# Patient Record
Sex: FEMALE | Race: WHITE
[De-identification: names, ages, dates, MRNs, and addresses within clinical notes are randomized per-mention and may not be internally consistent; named-entity substitution may affect disease eponyms.]

---

## 2022-05-03 ENCOUNTER — HOSPITAL ENCOUNTER (EMERGENCY)
Dept: HOSPITAL 47 - EC | Age: 27
LOS: 1 days | Discharge: HOME | End: 2022-05-04
Payer: OTHER GOVERNMENT

## 2022-05-03 DIAGNOSIS — F39: Primary | ICD-10-CM

## 2022-05-03 DIAGNOSIS — F12.90: ICD-10-CM

## 2022-05-03 DIAGNOSIS — F17.200: ICD-10-CM

## 2022-05-03 DIAGNOSIS — F90.9: ICD-10-CM

## 2022-05-03 PROCEDURE — 82075 ASSAY OF BREATH ETHANOL: CPT

## 2022-05-03 PROCEDURE — 99284 EMERGENCY DEPT VISIT MOD MDM: CPT

## 2022-05-03 NOTE — ED
General Adult HPI





- General


Source: patient, RN notes reviewed, old records reviewed


Mode of arrival: ambulatory


Limitations: no limitations





<Niles Betancourt - Last Filed: 05/03/22 20:54>





<Rafael Mccormick - Last Filed: 05/04/22 01:36>





- General


Chief complaint: Psychiatric Symptoms


Stated complaint: Mental health


Time Seen by Provider: 05/03/22 19:00





- History of Present Illness


Initial comments: 





This is a 27-year-old female who has a past medical history significant for 

multiple suicide attempts.  Patient states that she has been diagnosed with 

borderline personality disorder.  Patient states she has been having quite a few

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

suicidal if she doesn't get some help.  Patient states she's had taken pills 

multiple times and once tried to hang herself in the past.  Patient does not 

want to get back to making an attempt.  Patient denies any one single thing that

pushed her over the edge recently she states is just as dresses all added 

together.  Patient denies any drug use or alcohol use patient denies any 

physical complaints today except for some mild lower back pain. 

(Niles Betancourt)





- Related Data


                                    Allergies











Allergy/AdvReac Type Severity Reaction Status Date / Time


 


No Known Allergies Allergy   Verified 05/03/22 21:15














Review of Systems


ROS Other: All systems not noted in ROS Statement are negative.





<Niles Betancourt - Last Filed: 05/03/22 20:54>


ROS Other: All systems not noted in ROS Statement are negative.





<Rafael Mccormick - Last Filed: 05/04/22 01:36>


ROS Statement: 


Those systems with pertinent positive or pertinent negative responses have been 

documented in the HPI.








Past Medical History


Past Medical History: No Reported History


History of Any Multi-Drug Resistant Organisms: None Reported


Past Surgical History: No Surgical Hx Reported


Past Psychological History: ADD/ADHD, Panic Disorder


Smoking Status: Current every day smoker


Past Alcohol Use History: Occasional


Past Drug Use History: Marijuana





<Niles Betancourt - Last Filed: 05/03/22 20:54>





General Exam


Limitations: no limitations





<Niles Betancourt - Last Filed: 05/03/22 20:54>





- General Exam Comments


Initial Comments: 





GENERAL:


Patient is well-developed and well-nourished.  Patient is nontoxic and well-

hydrated and is in mild distress.





ENT:


Neck is soft and supple.  No significant lymphadenopathy is noted.  Oropharynx 

is clear.  Moist mucous membranes.  Neck has full range of motion without 

eliciting any pain. 





EYES:


The sclera were anicteric and conjunctiva were pink and moist.  Extraocular 

movements were intact and pupils were equal round and reactive to light.  

Eyelids were unremarkable.





PULMONARY:


Unlabored respirations.  Good breath sounds bilaterally.  No audible rales 

rhonchi or wheezing was noted.





CARDIOVASCULAR:


There is a regular rate and rhythm without any murmurs gallops or rubs.





ABDOMEN:


Soft and nontender with normal bowel sounds.  





SKIN:


Patient has quite a few areas of excoriation on her face she states she picks at

her face when she is extremely stressed





NEUROLOGIC:


Patient is alert and oriented x3.  Cranial nerves II through XII are grossly 

intact.  Motor and sensory are also intact.  Normal speech, volume and content. 

Symmetrical smile. 





MUSCULOSKELETAL:


Normal extremities with adequate strength and full range of motion.  





LYMPHATICS:


No significant lymphadenopathy is noted





PSYCHIATRIC:


Patient states there on the verge of becoming suicidal though she is not 

currently.  Patient states she cannot handle the stresses in her life currently 

(Niles Betancourt)





Course





                                   Vital Signs











  05/03/22 05/03/22





  17:36 19:25


 


Temperature 98.3 F 98.2 F


 


Pulse Rate 89 89


 


Respiratory 20 17





Rate  


 


Blood Pressure 114/79 115/72


 


O2 Sat by Pulse 98 95





Oximetry  














Medical Decision Making





<Niles Betancourt - Last Filed: 05/03/22 20:54>





- Medical Decision Making





Dr. Cotton taking over care of this patient at 9 PM (Niles Betancourt)





Disposition





<Niles Betancourt - Last Filed: 05/03/22 20:54>


Is patient prescribed a controlled substance at d/c from ED?: No





<Rafael Mccormick - Last Filed: 05/04/22 01:36>


Clinical Impression: 


 Mood disorder





Disposition: HOME SELF-CARE


Condition: Good


Instructions (If sedation given, give patient instructions):  Mood Disorders 

(ED)


Referrals: 


Desmond Houston [Primary Care Provider] - 1-2 days

## 2022-05-04 VITALS
SYSTOLIC BLOOD PRESSURE: 112 MMHG | HEART RATE: 72 BPM | TEMPERATURE: 97.7 F | RESPIRATION RATE: 14 BRPM | DIASTOLIC BLOOD PRESSURE: 73 MMHG

## 2022-08-15 ENCOUNTER — HOSPITAL ENCOUNTER (EMERGENCY)
Dept: HOSPITAL 47 - EC | Age: 27
Discharge: LEFT BEFORE DISCHARGE | End: 2022-08-15
Payer: COMMERCIAL

## 2022-08-15 VITALS
TEMPERATURE: 98 F | DIASTOLIC BLOOD PRESSURE: 87 MMHG | RESPIRATION RATE: 20 BRPM | SYSTOLIC BLOOD PRESSURE: 116 MMHG | HEART RATE: 84 BPM

## 2022-08-15 DIAGNOSIS — Z53.21: Primary | ICD-10-CM

## 2022-08-15 PROCEDURE — 87635 SARS-COV-2 COVID-19 AMP PRB: CPT

## 2022-08-15 PROCEDURE — 99499 UNLISTED E&M SERVICE: CPT

## 2022-11-09 ENCOUNTER — HOSPITAL ENCOUNTER (INPATIENT)
Dept: HOSPITAL 47 - EC | Age: 27
LOS: 6 days | Discharge: HOME | DRG: 885 | End: 2022-11-15
Attending: PSYCHIATRY & NEUROLOGY | Admitting: PSYCHIATRY & NEUROLOGY
Payer: COMMERCIAL

## 2022-11-09 DIAGNOSIS — R45.851: ICD-10-CM

## 2022-11-09 DIAGNOSIS — Z20.822: ICD-10-CM

## 2022-11-09 DIAGNOSIS — Z71.6: ICD-10-CM

## 2022-11-09 DIAGNOSIS — Z79.899: ICD-10-CM

## 2022-11-09 DIAGNOSIS — F60.3: ICD-10-CM

## 2022-11-09 DIAGNOSIS — G47.00: ICD-10-CM

## 2022-11-09 DIAGNOSIS — F41.0: ICD-10-CM

## 2022-11-09 DIAGNOSIS — F10.10: ICD-10-CM

## 2022-11-09 DIAGNOSIS — F17.210: ICD-10-CM

## 2022-11-09 DIAGNOSIS — Z86.59: ICD-10-CM

## 2022-11-09 DIAGNOSIS — F31.30: Primary | ICD-10-CM

## 2022-11-09 DIAGNOSIS — F12.20: ICD-10-CM

## 2022-11-09 PROCEDURE — 84443 ASSAY THYROID STIM HORMONE: CPT

## 2022-11-09 PROCEDURE — 83036 HEMOGLOBIN GLYCOSYLATED A1C: CPT

## 2022-11-09 PROCEDURE — 82075 ASSAY OF BREATH ETHANOL: CPT

## 2022-11-09 PROCEDURE — 82248 BILIRUBIN DIRECT: CPT

## 2022-11-09 PROCEDURE — 87635 SARS-COV-2 COVID-19 AMP PRB: CPT

## 2022-11-09 PROCEDURE — 80061 LIPID PANEL: CPT

## 2022-11-09 PROCEDURE — 80306 DRUG TEST PRSMV INSTRMNT: CPT

## 2022-11-09 PROCEDURE — 80053 COMPREHEN METABOLIC PANEL: CPT

## 2022-11-09 PROCEDURE — 81001 URINALYSIS AUTO W/SCOPE: CPT

## 2022-11-09 PROCEDURE — 81025 URINE PREGNANCY TEST: CPT

## 2022-11-09 PROCEDURE — 80178 ASSAY OF LITHIUM: CPT

## 2022-11-09 PROCEDURE — 99285 EMERGENCY DEPT VISIT HI MDM: CPT

## 2022-11-09 PROCEDURE — 85025 COMPLETE CBC W/AUTO DIFF WBC: CPT

## 2022-11-09 NOTE — ED
General Adult HPI





<JaceRafael - Last Filed: 11/10/22 02:18>





- General


Source: patient, RN notes reviewed, old records reviewed


Mode of arrival: ambulatory


Limitations: no limitations





<Murali Kilpatrick - Last Filed: 11/10/22 23:33>





- General


Chief complaint: Psychiatric Symptoms


Stated complaint: mental health


Time Seen by Provider: 11/09/22 18:05





- History of Present Illness


Initial comments: 





Patient is a 27 year old female who presents emergency department complaining of

suicidal ideations, plan.  She does have a history depression.  Is on bupropion.

 States this is been a recurrent issue but over the last 1-2 weeks it is gotten 

worse.  Denies any worsening stressors.  Does endorse suicidal ideations, as 

well as a plan of possibly overdosing.  Denies any attempts.  Has been self 

harming.  Has multiple superficial lacerations over the arms and legs.  She is 

up-to-date on tetanus.  Denies homicidal ideations, attempts, plans.  Denies 

visual or auditory hallucinations.  Denies chest pain, abdominal pain, nausea, 

vomiting.  Denies any shortness breath.  His no other acute complaints at this 

time.  Patient presents seeking psychiatric evaluation at this time. 

(Murali Kilpatrick)





- Related Data


                                Home Medications











 Medication  Instructions  Recorded  Confirmed


 


Dextroamphetamine/Amphetamine 10 mg PO DAILY@1030 05/03/22 08/15/22





[Adderall]   


 


Dextroamphetamine/Amphetamine 15 mg PO DAILY@0500 05/03/22 08/15/22





[Adderall]   


 


busPIRone HCl [Buspar] 10 mg PO BID 08/15/22 08/15/22








                                  Previous Rx's











 Medication  Instructions  Recorded


 


Albuterol Sulfate [Albuterol 1 puff PO Q4-6H #8.5 gm 08/15/22





Sulfate Hfa]  


 


Azithromycin [Zithromax] 250 mg PO AS DIRECTED 5 Days #6 tab 08/15/22


 


CHLORPHEN-HYDROcod 8-10mg/5ml 5 ml PO Q12HR PRN 3 Days #30 ml 08/15/22





[Tussionex]  











                                    Allergies











Allergy/AdvReac Type Severity Reaction Status Date / Time


 


No Known Allergies Allergy   Verified 11/10/22 06:06














Review of Systems


ROS Other: All systems not noted in ROS Statement are negative.





<Rafael Mccormick - Last Filed: 11/10/22 02:18>


ROS Other: All systems not noted in ROS Statement are negative.





<Murali Kilpatrick - Last Filed: 11/10/22 23:33>


ROS Statement: 


Those systems with pertinent positive or pertinent negative responses have been 

documented in the HPI.





Review of Systems:


CONST: Denies fever 


EYES: Denies blurry vision 


ENT: Denies nasal congestion  


C/V:  Denies Chest pain


RESP: Denies shortness of breath 


GI: Denies abdominal pain 


: Denies dysuria  


SKIN: Denies rash.


MSK: Denies joint pain.


NEURO: Denies headache 


PSYCH: Denies homicidal ideations/plans/attempts.  Denies visual or auditory 

hallucinations.  She endorses suicidal ideation, plan.  Denies attempt.  

Endorses self-harm. (Murali Kilpatrick)





Past Medical History


Past Medical History: No Reported History


History of Any Multi-Drug Resistant Organisms: None Reported


Past Surgical History: No Surgical Hx Reported


Past Psychological History: ADD/ADHD, Panic Disorder


Smoking Status: Current every day smoker


Past Alcohol Use History: Occasional


Past Drug Use History: Marijuana





<Murali Kilpatrick - Last Filed: 11/10/22 23:33>





General Exam


Limitations: no limitations





<Murali Kilpatrick - Last Filed: 11/10/22 23:33>





- General Exam Comments


Initial Comments: 





General: Appears in no acute distress.


HEAD:  Normal with no signs of head trauma.


EYES:  PERRLA, EOMI, conjunctiva normal, no discharge.


ENT:  Hearing grossly intact, normal oropharynx.


RESPIRATORY:  Clear breath sounds bilaterally.  No wheezes, rales, or rhonchi.  


C/V:  Regular rate and rhythm. S1 and S2 auscultated, no edema, peripheral 

pulses 2+ and intact throughout


ABD:  Abd is soft, nontender, nondistended


EXT: Normal range of motion, no obvious deformity


SKIN:  No rashes or lesions observed on exposed skin.


NEURO: Alert and oriented 4. (Murali Kilpatrick)





Course


                                   Vital Signs











  11/09/22 11/10/22 11/10/22





  17:39 00:05 02:40


 


Temperature 98.8 F  98.6 F


 


Pulse Rate 108 H 96 76


 


Respiratory 18 16 18





Rate   


 


Blood Pressure 139/83  100/63


 


O2 Sat by Pulse 97 100 100





Oximetry   














Medical Decision Making





<Murali Kilpatrick - Last Filed: 11/10/22 23:33>





- Medical Decision Making





Based on the patient's presentation and physical exam, I do believe the patient 

requires psychiatric evaluation.  Sitter was ordered.  Suicide precautions were 

ordered.  She was placed in green scrubs.  BAT is 0.  UDS is pending.  Vital 

signs within acceptable limits.  She is up-to-date on her tetanus.





Patient is medically cleared for evaluation by psychiatry.  EPS is notified.  

Disposition is pending psychiatric evaluation.








Patient was evaluated by EPS eventually admitted to inpatient psychiatry. 

(Murali Kilpatrick)





- Lab Data


                                   Lab Results











  11/10/22 11/10/22 11/10/22 Range/Units





  00:05 00:05 00:05 


 


Urine Color   Yellow   


 


Urine Appearance   Cloudy H   (Clear)  


 


Urine pH   6.0   (5.0-8.0)  


 


Ur Specific Gravity   1.030   (1.001-1.035)  


 


Urine Protein   1+ H   (Negative)  


 


Urine Glucose (UA)   Negative   (Negative)  


 


Urine Ketones   1+ H   (Negative)  


 


Urine Blood   Negative   (Negative)  


 


Urine Nitrite   Positive H   (Negative)  


 


Urine Bilirubin   Negative   (Negative)  


 


Urine Urobilinogen   <2.0   (<2.0)  mg/dL


 


Ur Leukocyte Esterase   Negative   (Negative)  


 


Urine RBC   3   (0-5)  /hpf


 


Urine WBC   3   (0-5)  /hpf


 


Ur Squamous Epith Cells   24 H   (0-4)  /hpf


 


Urine Bacteria   Moderate H   (None)  /hpf


 


Urine Mucus   Many H   (None)  /hpf


 


Urine HCG, Qual    Not Detected  (Not Detectd)  


 


Urine Opiates Screen  Not Detected    (NotDetected)  


 


Ur Oxycodone Screen  Not Detected    (NotDetected)  


 


Urine Methadone Screen  Not Detected    (NotDetected)  


 


Ur Propoxyphene Screen  Not Detected    (NotDetected)  


 


Ur Barbiturates Screen  Not Detected    (NotDetected)  


 


U Tricyclic Antidepress  Not Detected    (NotDetected)  


 


Ur Phencyclidine Scrn  Not Detected    (NotDetected)  


 


Ur Amphetamines Screen  Not Detected    (NotDetected)  


 


U Methamphetamines Scrn  Not Detected    (NotDetected)  


 


U Benzodiazepines Scrn  Not Detected    (NotDetected)  


 


Urine Cocaine Screen  Not Detected    (NotDetected)  


 


U Marijuana (THC) Screen  Detected H    (NotDetected)  


 


Coronavirus (PCR)     (Not Detectd)  














  11/10/22 Range/Units





  02:40 


 


Urine Color   


 


Urine Appearance   (Clear)  


 


Urine pH   (5.0-8.0)  


 


Ur Specific Gravity   (1.001-1.035)  


 


Urine Protein   (Negative)  


 


Urine Glucose (UA)   (Negative)  


 


Urine Ketones   (Negative)  


 


Urine Blood   (Negative)  


 


Urine Nitrite   (Negative)  


 


Urine Bilirubin   (Negative)  


 


Urine Urobilinogen   (<2.0)  mg/dL


 


Ur Leukocyte Esterase   (Negative)  


 


Urine RBC   (0-5)  /hpf


 


Urine WBC   (0-5)  /hpf


 


Ur Squamous Epith Cells   (0-4)  /hpf


 


Urine Bacteria   (None)  /hpf


 


Urine Mucus   (None)  /hpf


 


Urine HCG, Qual   (Not Detectd)  


 


Urine Opiates Screen   (NotDetected)  


 


Ur Oxycodone Screen   (NotDetected)  


 


Urine Methadone Screen   (NotDetected)  


 


Ur Propoxyphene Screen   (NotDetected)  


 


Ur Barbiturates Screen   (NotDetected)  


 


U Tricyclic Antidepress   (NotDetected)  


 


Ur Phencyclidine Scrn   (NotDetected)  


 


Ur Amphetamines Screen   (NotDetected)  


 


U Methamphetamines Scrn   (NotDetected)  


 


U Benzodiazepines Scrn   (NotDetected)  


 


Urine Cocaine Screen   (NotDetected)  


 


U Marijuana (THC) Screen   (NotDetected)  


 


Coronavirus (PCR)  Not Detected  (Not Detectd)  














Disposition


Is patient prescribed a controlled substance at d/c from ED?: No





<Rafael Mccormick - Last Filed: 11/10/22 02:18>





<Murali Kilpatrick - Last Filed: 11/10/22 23:33>


Clinical Impression: 


 Mood disorder





Disposition: ADMITTED AS IP TO THIS [facility]


Condition: Fair

## 2022-11-10 LAB
KETONES UR QL STRIP.AUTO: (no result)
PH UR: 6 [PH] (ref 5–8)
PROT UR QL: (no result)
RBC UR QL: 3 /HPF (ref 0–5)
SP GR UR: 1.03 (ref 1–1.03)
SQUAMOUS UR QL AUTO: 24 /HPF (ref 0–4)
UROBILINOGEN UR QL STRIP: <2 MG/DL (ref ?–2)
WBC #/AREA URNS HPF: 3 /HPF (ref 0–5)

## 2022-11-10 RX ADMIN — NICOTINE SCH: 7 PATCH, EXTENDED RELEASE TRANSDERMAL at 12:04

## 2022-11-10 RX ADMIN — LITHIUM CARBONATE SCH MG: 300 CAPSULE, GELATIN COATED ORAL at 21:00

## 2022-11-10 RX ADMIN — LITHIUM CARBONATE SCH MG: 300 CAPSULE, GELATIN COATED ORAL at 12:03

## 2022-11-10 NOTE — P.HP
Psychiatric H&P





- .


H&P Date: 11/10/22


History & Physical: 


                                    Allergies











Allergy/AdvReac Type Severity Reaction Status Date / Time


 


No Known Allergies Allergy   Verified 11/10/22 06:06








                                   Vital Signs











Temp  98.3 F   11/10/22 05:27


 


Pulse  74   11/10/22 05:27


 


Resp  18   11/10/22 05:27


 


BP  126/67   11/10/22 05:27


 


Pulse Ox  96   11/10/22 05:27


 


FiO2      








                                 Intake & Output











 11/09/22 11/10/22 11/10/22





 18:59 06:59 18:59


 


Weight 59.421 kg 58.06 kg 








                             Laboratory Last Values











Urine Color  Yellow   11/10/22  00:05    


 


Urine Appearance  Cloudy  (Clear)  H  11/10/22  00:05    


 


Urine pH  6.0  (5.0-8.0)   11/10/22  00:05    


 


Ur Specific Gravity  1.030  (1.001-1.035)   11/10/22  00:05    


 


Urine Protein  1+  (Negative)  H  11/10/22  00:05    


 


Urine Glucose (UA)  Negative  (Negative)   11/10/22  00:05    


 


Urine Ketones  1+  (Negative)  H  11/10/22  00:05    


 


Urine Blood  Negative  (Negative)   11/10/22  00:05    


 


Urine Nitrite  Positive  (Negative)  H  11/10/22  00:05    


 


Urine Bilirubin  Negative  (Negative)   11/10/22  00:05    


 


Urine Urobilinogen  <2.0 mg/dL (<2.0)   11/10/22  00:05    


 


Ur Leukocyte Esterase  Negative  (Negative)   11/10/22  00:05    


 


Urine RBC  3 /hpf (0-5)   11/10/22  00:05    


 


Urine WBC  3 /hpf (0-5)   11/10/22  00:05    


 


Ur Squamous Epith Cells  24 /hpf (0-4)  H  11/10/22  00:05    


 


Urine Bacteria  Moderate /hpf (None)  H  11/10/22  00:05    


 


Urine Mucus  Many /hpf (None)  H  11/10/22  00:05    


 


Urine HCG, Qual  Not Detected  (Not Detectd)   11/10/22  00:05    


 


Urine Opiates Screen  Not Detected  (NotDetected)   11/10/22  00:05    


 


Ur Oxycodone Screen  Not Detected  (NotDetected)   11/10/22  00:05    


 


Urine Methadone Screen  Not Detected  (NotDetected)   11/10/22  00:05    


 


Ur Propoxyphene Screen  Not Detected  (NotDetected)   11/10/22  00:05    


 


Ur Barbiturates Screen  Not Detected  (NotDetected)   11/10/22  00:05    


 


U Tricyclic Antidepress  Not Detected  (NotDetected)   11/10/22  00:05    


 


Ur Phencyclidine Scrn  Not Detected  (NotDetected)   11/10/22  00:05    


 


Ur Amphetamines Screen  Not Detected  (NotDetected)   11/10/22  00:05    


 


U Methamphetamines Scrn  Not Detected  (NotDetected)   11/10/22  00:05    


 


U Benzodiazepines Scrn  Not Detected  (NotDetected)   11/10/22  00:05    


 


Urine Cocaine Screen  Not Detected  (NotDetected)   11/10/22  00:05    


 


U Marijuana (THC) Screen  Detected  (NotDetected)  H  11/10/22  00:05    


 


Coronavirus (PCR)  Not Detected  (Not Detectd)   11/10/22  02:40    











11/10/22 12:31


IDENTIFYING DATA: Patient is a 27-year-old  female who is currently 

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





HPI: Patient presented to the hospital yesterday complaining of suicidal 

ideations with plans of possibly overdosing.  Patient also claimed in the ER 

that she is going to press for the past 1 or 2 weeks and also endorsing 

significant stressors and having lacerations over her legs and also her arms and

history of self harming.  Patient's UDS positive for THC.  Patient has been 

depressed for the past couple of weeks.  She states that she "stopped existing" 

and described anhedonia, a motivation.  She also describes poor energy.  Claims 

that she is also feeling overwhelmed.  States that she has a history of having 

mood swings and explained having "highs" and described manic like features where

she has a lot of energy with decreased need for sleep and wanting to go out all 

the time.  She states that she frequently then crashes into a depressed mood.  

She states that she has had some stressors since moving to [place] last 

December.  Claims that she has been into a few bad relationships.  Claims that 

she has been difficulty finding hours with her job and also poor finances.  

Claims that she has significant anxiety during the day.  She says she is still 

having suicidal thoughts, no intent or plan.  Describes not having any homicidal

ideations.  Claims that her sleep and appetite have been "on and off". At this 

time patient denies any auditory or visual hallucinations.  Patient denies any 

current flight of ideas racing thoughts and increased in goal directed behavior.

 Patient admits to using cannabis heavy doses frequently/daily, cigarettes 

daily, alcohol, binge episodes at times on weekends.





PAST PSYCHIATRIC HISTORY: Patient states that she has a history of bipolar 

disorder and borderline personality disorder.  She also states that she does 

have a history of ADHD.  And claims that she has been on different medications 

in the past including BuSpar, Wellbutrin, Adderall.  She states that she has 

been hospitalized psychiatrically several times in the past approximately 14 or 

15 times in different states.  Patient denies any psychiatric outpatient follow-

up.  He claims that she has had several suicide attempts in the past however did

not describe what she has done.





PMH:denies





ALLERGIES: as per EMR





CHEMICAL DEPENDENCY HISTORY: as per HPI





FAMILY PSYCHIATRIC/SUBSTANCE USE HISTORY: Claims that there is significant 

bipolar disorder in her family





SOCIAL HISTORY: Patient was born and raised in [facility].  She states 

that she was "raised all over in a  family".  States that her parents 

currently live in [place].  She states that she moved to [place] about a year 

ago.  Claims that she completed high school.  Denies any legal history.  Does 

not have any kids.  Lives alone in a house.  She works as a cleaning lady..





MENTAL STATUS EXAM: 


General Appearance: Patient appears to be then, has very short hair, glasses, 

acne, stated age is alert, directable, and attempts to cooperate. Patient 

appears to have poor hygiene and grooming.  She has several abrasions and 

lacerations over her legs and her arms.


Behavior: Patient is seated without any agitated behavior. 


Speech: Patient's speech is fluent and nonpressured.


Mood/Affect: Patient reports their mood is depressed and anxious, affect is 

congruent and constricted. 


Suicidality/Homicidality:  Patient denies having any homicidal ideation intent 

or plan. [Described having suicidal thoughts, no intent or plan.


Perceptions: Patient denies any visual hallucinations and denies any auditory 

hallucinations


Though content/process: There is no evidence of any delusional thought content 

and thought process is linear and goal-directed.  Rambles at times.


Memory and concentration: AOX3, grossly intact for the purposes of this session.

 Can spell "WORLD" backwards


Judgment and insight: poor





STRENGTHS/WEAKNESSES: strength is that patient is resilient. Weakness is that 

patient has poor judgment and is impulsive





INTELLECT: average





IMPRESSIONS: 


Bipolar disorder, current episode depressed


Borderline personality disorder


Cannabis use disorder, severe


Alcohol use disorder mild


Nicotine dependence





PLAN: 


-Patient is admitted under voluntary status to [facility] for stabilization of 

psychiatric symptoms and safety. Patient has signed adult voluntary form and 

medication consent and is placed in patient's chart. 


-Medications : Will start patient on lithium 300 mg twice a day for mood 

stabilization/suicidal thoughts.  Trazodone 50 mg daily at bedtime for 

mood/insomnia.  Vistaril by mouth when necessary for anxiety.


-Ativan and Haldol PRN for agitation/aggression


-Patient was counselled on substance abuse and desired to cut back on use


-Patient was informed of the risks, benefits and side effects of the medication 

and patient verbally consented to taking the medications. Patient signed med 

consent form and was placed in chart.


-Internal Medicine consult to perform medical evaluation and physical.


-NRT - nicotine patch


-SW on board for discharge planning. Encourage patient to participate in groups 

to work on coping skills.

## 2022-11-11 LAB
ALBUMIN SERPL-MCNC: 4.7 G/DL (ref 3.5–5)
ALP SERPL-CCNC: 71 U/L (ref 38–126)
ALT SERPL-CCNC: 21 U/L (ref 4–34)
ANION GAP SERPL CALC-SCNC: 9 MMOL/L
AST SERPL-CCNC: 18 U/L (ref 14–36)
BASOPHILS # BLD AUTO: 0 K/UL (ref 0–0.2)
BASOPHILS NFR BLD AUTO: 0 %
BILIRUB INDIRECT SERPL-MCNC: 0.4 MG/DL (ref 0–1.1)
BILIRUBIN DIRECT+TOT PNL SERPL-MCNC: 0.3 MG/DL (ref 0–0.2)
BUN SERPL-SCNC: 9 MG/DL (ref 7–17)
CALCIUM SPEC-MCNC: 9.6 MG/DL (ref 8.4–10.2)
CHLORIDE SERPL-SCNC: 104 MMOL/L (ref 98–107)
CHOLEST SERPL-MCNC: 130 MG/DL (ref 0–200)
CO2 SERPL-SCNC: 26 MMOL/L (ref 22–30)
EOSINOPHIL # BLD AUTO: 0.2 K/UL (ref 0–0.7)
EOSINOPHIL NFR BLD AUTO: 3 %
ERYTHROCYTE [DISTWIDTH] IN BLOOD BY AUTOMATED COUNT: 4.56 M/UL (ref 3.8–5.4)
ERYTHROCYTE [DISTWIDTH] IN BLOOD: 12.7 % (ref 11.5–15.5)
GLUCOSE SERPL-MCNC: 78 MG/DL (ref 74–99)
HCT VFR BLD AUTO: 42.7 % (ref 34–46)
HDLC SERPL-MCNC: 51.1 MG/DL (ref 40–60)
HGB BLD-MCNC: 14.4 GM/DL (ref 11.4–16)
LDLC SERPL CALC-MCNC: 62.6 MG/DL (ref 0–131)
LYMPHOCYTES # SPEC AUTO: 2.2 K/UL (ref 1–4.8)
LYMPHOCYTES NFR SPEC AUTO: 28 %
MCH RBC QN AUTO: 31.5 PG (ref 25–35)
MCHC RBC AUTO-ENTMCNC: 33.6 G/DL (ref 31–37)
MCV RBC AUTO: 93.6 FL (ref 80–100)
MONOCYTES # BLD AUTO: 0.3 K/UL (ref 0–1)
MONOCYTES NFR BLD AUTO: 4 %
NEUTROPHILS # BLD AUTO: 4.8 K/UL (ref 1.3–7.7)
NEUTROPHILS NFR BLD AUTO: 63 %
PLATELET # BLD AUTO: 337 K/UL (ref 150–450)
POTASSIUM SERPL-SCNC: 4.6 MMOL/L (ref 3.5–5.1)
PROT SERPL-MCNC: 7.4 G/DL (ref 6.3–8.2)
SODIUM SERPL-SCNC: 139 MMOL/L (ref 137–145)
TRIGL SERPL-MCNC: 81.6 MG/DL (ref 0–149)
VLDLC SERPL CALC-MCNC: 16.32 MG/DL (ref 5–40)
WBC # BLD AUTO: 7.7 K/UL (ref 3.8–10.6)

## 2022-11-11 RX ADMIN — LITHIUM CARBONATE SCH MG: 300 CAPSULE, GELATIN COATED ORAL at 20:16

## 2022-11-11 RX ADMIN — NICOTINE SCH: 7 PATCH, EXTENDED RELEASE TRANSDERMAL at 08:41

## 2022-11-11 RX ADMIN — LITHIUM CARBONATE SCH MG: 300 CAPSULE, GELATIN COATED ORAL at 08:41

## 2022-11-11 NOTE — P.PN
Progress Note - Text


Progress Note Date: 11/11/22





Interval History:


Patient was seen sitting in on group today and was directable and agreeable to 

speak with writer in the office.  Patient appears to have a brighter affect 

today.  She states that she feels "a bit better" with regards to her mood.  She 

is denying any mood swings at this time.  States that she is feeling less 

depressed and also mild improvement in her anxiety.  She does state that she 

does feel "mildly nauseous" at times during the day.  We spoke more about her 

medications which she is agreeable to continuing taking.  We'll check a lithium 

level on Monday.  She claims that she slept quite a bit during the day yesterday

and also broken up at nighttime wants to remain on the trazodone. At this time 

patient denies any suicidal or homical ideations, intent or plan. Patient denies

any auditory, visual hallucinations and denies any paranoia or delusions. 

Patient denies any side effects from the medications and has been compliant with

meds. 





Mental Status Exam:


General Appearance: Patient appears to be then, has very short hair, glasses, 

acne, stated age is alert, directable, and attempts to cooperate. Patient 

appears to have improving hygiene and grooming. She has several abrasions and 

lacerations over her legs and her arms.


Behavior: Patient is seated without any agitated behavior. 


Speech: Patient's speech is fluent and nonpressured.


Mood/Affect: Patient reports their mood is depressed and anxious, affect is 

congruent and constricted. 


Suicidality/Homicidality:  Patient denies having any homicidal ideation intent 

or plan. Denies having suicidal thoughts, no intent or plan.


Perceptions: Patient denies any visual hallucinations and denies any auditory 

hallucinations


Though content/process: There is no evidence of any delusional thought content 

and thought process is linear and goal-directed.  Rambles at times.


Memory and concentration: AOX3, grossly intact for the purposes of this session


Judgment and insight: poor, improving moderately





IMPRESSIONS: 


Bipolar disorder, current episode depressed


Borderline personality disorder


Cannabis use disorder, severe


Alcohol use disorder mild


Nicotine dependence





Plan:


-Patient continues to meet criteria for inpatient psychiatric admission for 

symptom stabilization and safety. Patient has signed adult voluntary form and 

medication consent and was placed in patient's chart.


-Medications: Continue lithium 300 mg twice a day for mood 

stabilization/suicidal thoughts.  Trazodone 50 mg daily at bedtime for mo

od/insomnia. Vistaril by mouth when necessary for anxiety.  Added Zofran when 

necessary for nausea.


-When necessary Ativan and Haldol for agitation/aggression.


-NRT - nicotine patch


-SW on board for discharge planning.  Encouraged the patient to participate in 

milieu. will check lithium level monday and likely discharge either monday vs 

tuesday.

## 2022-11-12 RX ADMIN — NICOTINE SCH: 7 PATCH, EXTENDED RELEASE TRANSDERMAL at 08:57

## 2022-11-12 RX ADMIN — LITHIUM CARBONATE SCH MG: 300 CAPSULE, GELATIN COATED ORAL at 08:57

## 2022-11-12 RX ADMIN — LITHIUM CARBONATE SCH MG: 300 CAPSULE, GELATIN COATED ORAL at 20:37

## 2022-11-12 NOTE — P.PN
Progress Note - Text


Progress Note Date: 11/12/22


Interval history: 


Patient was seen eating her snack and was directable and agreeable to speak with

writer. She reports mixed emotions of anxiety and depression, mood had been up 

and down today, and shows me that she self-harmed by cutting herself with a 

piece of plastic, has 3 new superficial scratches on her left forearm and has 

multiple nail marks on her right arm. She reports the unit has been loud due to 

influx of more patients today and this has been distressing for her today. At 

this time, patient endorses suicidal thoughts with no specific plan, and denies 

homicidal ideation, intent or plan. Denies any auditory or visual 

hallucinations. Patient denies any side effects from the medications and has 

been compliant with meds. She has a long history of trauma, and has fair sleep. 

She has been on Seroquel previously and reports she felt worse on this and 

overdosed on it several years ago. 





Mental status exam: 


General Appearance: Patient appears to be stated age, slender, has multiple 

excoriation marks on face, scratches/superficial cuts on left forearm, and nail 

marks on right arm. 


Behavior: No agitated behavior. Reports she self-harmed today as described 

above.


Speech: Patient's speech is fluent and non-pressured. 


Mood/Affect: Mood is "up and down", affect is congruent and labile.


Suicidality/Homicidality:  Patient endorses SI today, but denies homicidal 

ideation intent or plan.  


Perceptions: Patient denies any auditory or visual hallucinations.  


Though content/process: There is no evidence of any delusional thought content 

and thought process is linear and goal-directed. 


Memory and concentration: AOX3, grossly intact for the purposes of this session


Judgment and insight: poor, improving mildly





Assessment/Plan: 


Continue with current diagnosis. Patient continues to meet criteria for 

inpatient psychiatric admission for symptom stabilization and safety.


Patient will be maintained on current psychotropic medication regimen. She has 

tried several meds in the past with side effects. She prefers to keep meds the 

same for today and we will discuss again tomorrow.


Monitor for medication compliance and for any psychotropic medication side 

effects. 


Will continue to monitor ongoing response to treatment.  Encouraged 

participation in milieu.

## 2022-11-13 RX ADMIN — LURASIDONE HYDROCHLORIDE SCH MG: 20 TABLET, FILM COATED ORAL at 18:49

## 2022-11-13 RX ADMIN — NICOTINE SCH: 7 PATCH, EXTENDED RELEASE TRANSDERMAL at 09:36

## 2022-11-13 RX ADMIN — LITHIUM CARBONATE SCH MG: 300 CAPSULE, GELATIN COATED ORAL at 08:41

## 2022-11-13 RX ADMIN — LITHIUM CARBONATE SCH MG: 300 CAPSULE, GELATIN COATED ORAL at 21:16

## 2022-11-13 NOTE — P.PN
Progress Note - Text


Progress Note Date: 11/13/22


Interval history: 


Patient was seen attending group and was directable and agreeable to speak with 

writer. She reports her mood is somewhat better today since the unit is not as 

'chaotic' as it was yesterday. She reports having mood swings, fair sleep with 

nighttime awakenings and difficulty returning to sleep. She reports feelings of 

'panic' and has a long history of chilhood trauma. She also reports a history of

"bulimia and anorexia" in her teens and does not want to take medications that 

would cause weight gain. We discussed her medications and other medication 

options for mood stabilization, and she agrees to start Latuda for mood.  At 

this time, patient denies suicidal or homicidal ideation, intent or plan. Denies

any auditory or visual hallucinations. Patient denies any side effects from the 

medications and has been compliant with meds. 





Mental status exam: 


General Appearance: Patient appears to be stated age, slender, has multiple 

excoriation marks on face, scratches/superficial cuts on left forearm, and nail 

marks on right arm. 


Behavior: No agitated behavior. 


Speech: Patient's speech is fluent and non-pressured. 


Mood/Affect: Mood is improving mildly, affect is congruent and labile.


Suicidality/Homicidality:  Patient denies suicidal and homicidal ideation, 

intent or plan.  


Perceptions: Patient denies any auditory or visual hallucinations.  


Though content/process: There is no evidence of any delusional thought content 

and thought process is linear and goal-directed. 


Memory and concentration: AOX3, grossly intact for the purposes of this session


Judgment and insight: poor, improving mildly





Assessment/Plan: 


Continue with current diagnosis. Patient continues to meet criteria for 

inpatient psychiatric admission for symptom stabilization and safety.


Start Latuda 20 mg daily with supper for mood stabilization, with plan to 

gradually increase as tolerated. 


Monitor for medication compliance and for any psychotropic medication side 

effects. 


Will continue to monitor ongoing response to treatment.  Encouraged 

participation in milieu.

## 2022-11-14 RX ADMIN — LITHIUM CARBONATE SCH MG: 300 CAPSULE, GELATIN COATED ORAL at 21:09

## 2022-11-14 RX ADMIN — LITHIUM CARBONATE SCH MG: 300 CAPSULE, GELATIN COATED ORAL at 08:41

## 2022-11-14 RX ADMIN — LURASIDONE HYDROCHLORIDE SCH MG: 20 TABLET, FILM COATED ORAL at 17:15

## 2022-11-14 RX ADMIN — NICOTINE SCH: 7 PATCH, EXTENDED RELEASE TRANSDERMAL at 08:41

## 2022-11-14 NOTE — P.PN
Progress Note - Text


Progress Note Date: 11/14/22





Interval history: 


Patient was seen sitting in her room today and was directable and agreeable to 

speak with writer.  Patient claims that she is doing a bit better today with 

regards to her mood however states that she does feel anxious during the day.  

Claims that she did take an Ativan over the weekend which helped.  She states 

that she feels uncomfortable with some patients on the unit when they are 

yelling and fighting.  She states that she is feeling more optimistic at this 

time and mood has been improving.  Claims that she has been trying to go to 

groups and participate his mother so she can.  States that her mood is becoming 

more stable at this time.  We spoke about her lithium level. At this time, 

patient denies suicidal or homicidal ideation, intent or plan. Denies any 

auditory or visual hallucinations. Patient denies any side effects from the 

medications and has been compliant with meds. 





Mental status exam: 


General Appearance: Patient appears to be stated age, slender, has multiple 

excoriation marks on face, scratches/superficial cuts on left forearm, and nail 

marks on right arm. 


Behavior: No agitated behavior.  Cooperative.


Speech: Patient's speech is fluent and non-pressured. 


Mood/Affect: Mood is improving mildly, affect is congruent 


Suicidality/Homicidality:  Patient denies suicidal and homicidal ideation, 

intent or plan.  


Perceptions: Patient denies any auditory or visual hallucinations.  


Though content/process: There is no evidence of any delusional thought content 

and thought process is linear and goal-directed. 


Memory and concentration: AOX3, grossly intact for the purposes of this session


Judgment and insight: improving mildly





Assessment/Plan: 


Continue with current diagnosis. Patient continues to meet criteria for 

inpatient psychiatric admission for symptom stabilization and safety.


Continue with Latuda 20 mg daily with supper for mood stabilization.  increase 

trazodone to 100 mg qhs for insomnia/ mood. Continue with lithium 300 milligrams

twice a day for mood stabilization/suicidal thoughts.  Vistaril when necessary 

for anxiety.


Monitor for medication compliance and for any psychotropic medication side 

effects. 


Will continue to monitor ongoing response to treatment.  Encouraged 

participation in milieu.  Likely discharge back home tomorrow.

## 2022-11-15 VITALS
SYSTOLIC BLOOD PRESSURE: 106 MMHG | TEMPERATURE: 97.5 F | DIASTOLIC BLOOD PRESSURE: 57 MMHG | HEART RATE: 86 BPM | RESPIRATION RATE: 17 BRPM

## 2022-11-15 RX ADMIN — LITHIUM CARBONATE SCH MG: 300 CAPSULE, GELATIN COATED ORAL at 09:09

## 2022-11-15 NOTE — P.DS
Providers


Date of admission: 


11/10/22 03:25





Expected date of discharge: 11/15/22


Attending physician: 


Sarabjit Cobb MD





Consults: 





                                        





11/10/22 03:49


Consult Physician Routine 


   Consulting Provider: [facility]


   Consult Reason/Comments: For H & P for Medical Follow Up


   Do you want consulting provider notified?: Yes











Primary care physician: 


St[facility] None








- Discharge Diagnosis(es)


(1) Bipolar disorder current episode depressed


Current Visit: Yes   Status: Acute   Priority: High   





(2) Borderline personality disorder


Current Visit: Yes   Status: Acute   Priority: Medium   





(3) Cannabis use disorder, severe, dependence


Current Visit: Yes   Status: Acute   Priority: Medium   





(4) Alcohol use disorder, mild, abuse


Current Visit: Yes   Status: Acute   Priority: Low   





(5) Nicotine dependence


Current Visit: Yes   Status: Acute   Priority: Low   


Hospital Course: 





Admission HPI:


Admission note was completed by writer "Patient is a 27-year-old  

female who is currently working as a cleaning lady locally, lives alone in a 

house, has no kids. Patient presented to the hospital yesterday complaining of 

suicidal ideations with plans of possibly overdosing.  Patient also claimed in 

the ER that she is going to press for the past 1 or 2 weeks and also endorsing 

significant stressors and having lacerations over her legs and also her arms and

history of self harming.  Patient's UDS positive for THC.  Patient has been 

depressed for the past couple of weeks.  She states that she "stopped existing" 

and described anhedonia, a motivation.  She also describes poor energy.  Claims 

that she is also feeling overwhelmed.  States that she has a history of having 

mood swings and explained having "highs" and described manic like features where

she has a lot of energy with decreased need for sleep and wanting to go out all 

the time.  She states that she frequently then crashes into a depressed mood.  

She states that she has had some stressors since moving to [place] last 

December.  Claims that she has been into a few bad relationships.  Claims that 

she has been difficulty finding hours with her job and also poor finances.  

Claims that she has significant anxiety during the day.  She says she is still 

having suicidal thoughts, no intent or plan.  Describes not having any homicidal

ideations.  Claims that her sleep and appetite have been "on and off". At this 

time patient denies any auditory or visual hallucinations.  Patient denies any 

current flight of ideas racing thoughts and increased in goal directed behavior.

 Patient admits to using cannabis heavy doses frequently/daily, cigarettes 

daily, alcohol, binge episodes at times on weekends."





Hospital course:


Upon admission to the unit patient was directable and agreeable to commence 

treatment and signed adult voluntary form. Patient got along well with other 

patients on the unit and followed unit protocol.  Patient was compliant with the

medications and denied any side effects throughout hospital course.  Patient was

started on latuda 20 mg with dinner for mood stabilization/depression, lithium 

300 mg bid for mood stabilization, trazodone 100 mg daily at bedtime for 

insomnia/mood, Vistaril when necessary for anxiety.  Patient spoke of her 

stressors and engaged in therapy both group and individual.  Patient was also 

seen by medical team for history and physical exam.  Patient had a lithium level

drawn on 11/14 - 0.9. Throughout the course of the hospitalization patient 

gradually improved with regards to mood stabilization, anxiety, suicidal 

thoughts, sleep and became more future oriented with improved insight and 

judgment. On the day of discharge patient denied any suicidal or homicidal 

ideations intent or plan denied any auditory or visual hallucinations. Patient 

endorsed wanting to live for her health and her future.  The patient denied any 

access to guns or weapons.  Patient denied any paranoia and did not endorse any 

delusions.  Patient does have a significant history of substance abuse and was 

counseled on abstaining from all substances including alcohol and marijuana. 

Patient was offered however declined inpatient substance-abuse rehab.  Patient 

was also counseled on the medications and need for regular compliance and was 

encouraged to follow-up with their outpatient appointment for mental health and 

also for primary care.  Prior to discharge a family meeting will be arranged by 

 to answer any questions and ensure safety upon discharge. 





Mental status exam:


General Appearance: Patient appears to be thin, short hair, wearing glasses, 

stated age is alert, pleasant, and cooperative. Patient is in no acute distress 

and has improved hygiene and grooming 


Behavior: Patient is calmly seated without any agitated behavior.


Speech: Patient's speech is fluent and nonpressured. 


Mood/Affect: Patient reports their mood is "better", affect is congruent and 

euthymic. 


Suicidality/Homicidality:  Patient denies having any suicidal or homicidal 

ideation intent or plan.  


Perceptions: Patient denies any auditory or visual hallucinations.  


Though content/process: There is no evidence of any delusional thought content 

and thought process is linear and goal-directed. more future oriented


Memory and concentration: AOX3, grossly intact for the purposes of this session.

Can spell "WORLD" backwards correctly.


Judgment and insight:  improved with guarded prognosis





Impression:


Bipolar disorder, current episode depressed


Borderline personality disorder


Cannabis use disorder severe


Alcohol use disorder mild


Nicotine dependence





Plan:


-Continue with discharge today as patient has improved and stabilized 

psychiatrically and is not currently an imminent threat to herself and/or 

others. Patient will remain at chronically elevated risk for harm to self and/or

others due to her impulsivity and substance abuse.


-Continue medications: [id number] mg with dinner for mood 

stabilization/depression, trazodone 100 mg daily at bedtime for insomnia/mood, 

lithium 300 mg twice a day for mood stabilization/suicidal thoughts, Vistaril 

when necessary for anxiety.


-Patient was counseled on the need for medication compliance and appropriate 

follow-up at mental health and also primary care for medical issues.  Patient 

verbalized understanding and agreed.


-Social work to arrange for and conduct family meeting to ensure safety upon 

discharge and answer any questions/concerns. Social work also to arrange for 

patients follow up appointments  for psychiatric care along with follow up with 

primary care provider.


-Patient counseled on abstaining from recreational drugs and marijuana and 

alcohol. Was informed/educated on the adverse effects on their physical and 

mental health. Patient verbally agreed and understood. Patient was offered 

substance abuse treatment however declined at this time.


-Patient was instructed to return to the hospital or seek immediate medical care

if their psychiatric or medical symptoms do worsen or reoccur.








                                    Allergies











Allergy/AdvReac Type Severity Reaction Status Date / Time


 


No Known Allergies Allergy   Verified 11/10/22 06:06











                               Laboratory Results











WBC  7.7 k/uL (3.8-10.6)   11/11/22  07:44    


 


RBC  4.56 m/uL (3.80-5.40)   11/11/22  07:44    


 


Hgb  14.4 gm/dL (11.4-16.0)   11/11/22  07:44    


 


Hct  42.7 % (34.0-46.0)   11/11/22  07:44    


 


MCV  93.6 fL (80.0-100.0)   11/11/22  07:44    


 


MCH  31.5 pg (25.0-35.0)   11/11/22  07:44    


 


MCHC  33.6 g/dL (31.0-37.0)   11/11/22  07:44    


 


RDW  12.7 % (11.5-15.5)   11/11/22  07:44    


 


Plt Count  337 k/uL (150-450)   11/11/22  07:44    


 


MPV  7.6   11/11/22  07:44    


 


Neutrophils %  63 %  11/11/22  07:44    


 


Lymphocytes %  28 %  11/11/22  07:44    


 


Monocytes %  4 %  11/11/22  07:44    


 


Eosinophils %  3 %  11/11/22  07:44    


 


Basophils %  0 %  11/11/22  07:44    


 


Neutrophils #  4.8 k/uL (1.3-7.7)   11/11/22  07:44    


 


Lymphocytes #  2.2 k/uL (1.0-4.8)   11/11/22  07:44    


 


Monocytes #  0.3 k/uL (0-1.0)   11/11/22  07:44    


 


Eosinophils #  0.2 k/uL (0-0.7)   11/11/22  07:44    


 


Basophils #  0.0 k/uL (0-0.2)   11/11/22  07:44    


 


Sodium  139 mmol/L (137-145)   11/11/22  07:44    


 


Potassium  4.6 mmol/L (3.5-5.1)   11/11/22  07:44    


 


Chloride  104 mmol/L ()   11/11/22  07:44    


 


Carbon Dioxide  26 mmol/L (22-30)   11/11/22  07:44    


 


Anion Gap  9 mmol/L  11/11/22  07:44    


 


BUN  9 mg/dL (7-17)   11/11/22  07:44    


 


Creatinine  0.78 mg/dL (0.52-1.04)   11/11/22  07:44    


 


Est GFR (CKD-EPI)AfAm  >90  (>60 ml/min/1.73 sqM)   11/11/22  07:44    


 


Est GFR (CKD-EPI)NonAf  >90  (>60 ml/min/1.73 sqM)   11/11/22  07:44    


 


Glucose  78 mg/dL (74-99)   11/11/22  07:44    


 


Estimated Ave Glu mg/dL  103   11/11/22  07:44    


 


Hemoglobin A1c  5.2 % (0.0-6.0)   11/11/22  07:44    


 


Calcium  9.6 mg/dL (8.4-10.2)   11/11/22  07:44    


 


Total Bilirubin  0.7 mg/dL (0.2-1.3)   11/11/22  07:44    


 


Conjugated Bilirubin  0.0 mg/dL (0.0-0.3)   11/11/22  07:44    


 


Unconjugated Bilirubin  0.4 mg/dL (0.0-1.1)   11/11/22  07:44    


 


Delta Bilirubin  0.3 mg/dL (0.0-0.2)  H  11/11/22  07:44    


 


AST  18 U/L (14-36)   11/11/22  07:44    


 


ALT  21 U/L (4-34)   11/11/22  07:44    


 


Alkaline Phosphatase  71 U/L ()   11/11/22  07:44    


 


Total Protein  7.4 g/dL (6.3-8.2)   11/11/22  07:44    


 


Albumin  4.7 g/dL (3.5-5.0)   11/11/22  07:44    


 


Triglycerides  81.60 mg/dL (0..00)   11/11/22  07:44    


 


Cholesterol  130.00 mg/dL (0..00)   11/11/22  07:44    


 


LDL Cholesterol, Calc  62.6 mg/dL (0.0-131.0)   11/11/22  07:44    


 


VLDL Cholesterol, Calc  16.32 mg/dL (5.00-40.00)   11/11/22  07:44    


 


HDL Cholesterol  51.10 mg/dL (40.00-60.00)   11/11/22  07:44    


 


Cholesterol/HDL Ratio  2.54 Ratio  11/11/22  07:44    


 


TSH  1.340 mIU/L (0.465-4.680)   11/11/22  07:44    


 


Urine Color  Yellow   11/10/22  00:05    


 


Urine Appearance  Cloudy  (Clear)  H  11/10/22  00:05    


 


Urine pH  6.0  (5.0-8.0)   11/10/22  00:05    


 


Ur Specific Gravity  1.030  (1.001-1.035)   11/10/22  00:05    


 


Urine Protein  1+  (Negative)  H  11/10/22  00:05    


 


Urine Glucose (UA)  Negative  (Negative)   11/10/22  00:05    


 


Urine Ketones  1+  (Negative)  H  11/10/22  00:05    


 


Urine Blood  Negative  (Negative)   11/10/22  00:05    


 


Urine Nitrite  Positive  (Negative)  H  11/10/22  00:05    


 


Urine Bilirubin  Negative  (Negative)   11/10/22  00:05    


 


Urine Urobilinogen  <2.0 mg/dL (<2.0)   11/10/22  00:05    


 


Ur Leukocyte Esterase  Negative  (Negative)   11/10/22  00:05    


 


Urine RBC  3 /hpf (0-5)   11/10/22  00:05    


 


Urine WBC  3 /hpf (0-5)   11/10/22  00:05    


 


Ur Squamous Epith Cells  24 /hpf (0-4)  H  11/10/22  00:05    


 


Urine Bacteria  Moderate /hpf (None)  H  11/10/22  00:05    


 


Urine Mucus  Many /hpf (None)  H  11/10/22  00:05    


 


Urine HCG, Qual  Not Detected  (Not Detectd)   11/10/22  00:05    


 


Urine Opiates Screen  Not Detected  (NotDetected)   11/10/22  00:05    


 


Ur Oxycodone Screen  Not Detected  (NotDetected)   11/10/22  00:05    


 


Urine Methadone Screen  Not Detected  (NotDetected)   11/10/22  00:05    


 


Ur Propoxyphene Screen  Not Detected  (NotDetected)   11/10/22  00:05    


 


Ur Barbiturates Screen  Not Detected  (NotDetected)   11/10/22  00:05    


 


U Tricyclic Antidepress  Not Detected  (NotDetected)   11/10/22  00:05    


 


Ur Phencyclidine Scrn  Not Detected  (NotDetected)   11/10/22  00:05    


 


Ur Amphetamines Screen  Not Detected  (NotDetected)   11/10/22  00:05    


 


U Methamphetamines Scrn  Not Detected  (NotDetected)   11/10/22  00:05    


 


U Benzodiazepines Scrn  Not Detected  (NotDetected)   11/10/22  00:05    


 


Lithium  0.9 mmol/L  11/14/22  11:32    


 


Urine Cocaine Screen  Not Detected  (NotDetected)   11/10/22  00:05    


 


U Marijuana (THC) Screen  Detected  (NotDetected)  H  11/10/22  00:05    


 


Coronavirus (PCR)  Not Detected  (Not Detectd)   11/10/22  02:40    











                                   Vital Signs











Temp  97.5 F L  11/15/22 06:10


 


Pulse  86   11/15/22 06:10


 


Resp  17   11/15/22 06:10


 


BP  106/57   11/15/22 06:10


 


Pulse Ox  96   11/15/22 06:10


 


FiO2      











Patient Condition at Discharge: Stable





Plan - Discharge Summary


New Discharge Prescriptions: 


New


   traZODone HCL [Desyrel] 100 mg PO HS 30 Days  tab


   Lurasidone [Latuda] 20 mg PO W/SUPPER 30 Days  tab


   Lithium Carbonate 300 mg PO BID 30 Days  cap


   Acetaminophen Tab [Tylenol] 650 mg PO Q4HR PRN  tab


     PRN Reason: Pain/Discomfort


   hydrOXYzine pamoate [Vistaril] 25 mg PO BID PRN 14 Days  cap


     PRN Reason: Anxiety





Continue


   Albuterol Sulfate [Albuterol Sulfate Hfa] 1 puff PO Q4-6H #8.5 gm





Discontinued


   busPIRone HCl [Buspar] 10 mg PO BID


   Dextroamphetamine/Amphetamine [Adderall] 10 mg PO DAILY@1030


   Dextroamphetamine/Amphetamine [Adderall] 15 mg PO DAILY@0500


   CHLORPHEN-HYDROcod 8-10mg/5ml [Tussionex] 5 ml PO Q12HR PRN 3 Days #30 ml


     PRN Reason: Cough


   Azithromycin [Zithromax] 250 mg PO AS DIRECTED 5 Days #6 tab


Discharge Medication List





Albuterol Sulfate [Albuterol Sulfate Hfa] 1 puff PO Q4-6H #8.5 gm 08/15/22 [Rx]


Acetaminophen Tab [Tylenol] 650 mg PO Q4HR PRN  tab 11/15/22 [Rx]


Lithium Carbonate 300 mg PO BID 30 Days  cap 11/15/22 [Rx]


Lurasidone [Latuda] 20 mg PO W/SUPPER 30 Days  tab 11/15/22 [Rx]


hydrOXYzine pamoate [Vistaril] 25 mg PO BID PRN 14 Days  cap 11/15/22 [Rx]


traZODone HCL [Desyrel] 100 mg PO HS 30 Days  tab 11/15/22 [Rx]








Follow up Appointment(s)/Referral(s): 


[facility] [Outside] - 1 Week


None,[facility] [Primary Care Provider] - 1-2 days


Activity/Diet/Wound Care/Special Instructions: 


Avoid the use of street drugs and alcohol.  Take all prescriptions as 

prescribed.  When you are in need of refills on your medications, please contact

your medical provider and/or outpatient psychiatrist to have this done.  Please 

go to scheduled outpatient appointment for aftercare treatment.  If symptoms 

return or become worse, call the crisis line at [phone] and/or go to the 

nearest emergency room for evaluation. 


Discharge Disposition: HOME SELF-CARE

## 2023-08-23 ENCOUNTER — HOSPITAL ENCOUNTER (EMERGENCY)
Dept: HOSPITAL 47 - EC | Age: 28
Discharge: HOME | End: 2023-08-23
Payer: COMMERCIAL

## 2023-08-23 VITALS — DIASTOLIC BLOOD PRESSURE: 72 MMHG | HEART RATE: 79 BPM | TEMPERATURE: 98 F | SYSTOLIC BLOOD PRESSURE: 112 MMHG

## 2023-08-23 VITALS — RESPIRATION RATE: 18 BRPM

## 2023-08-23 DIAGNOSIS — F41.0: ICD-10-CM

## 2023-08-23 DIAGNOSIS — Z79.899: ICD-10-CM

## 2023-08-23 DIAGNOSIS — F90.9: ICD-10-CM

## 2023-08-23 DIAGNOSIS — M54.9: Primary | ICD-10-CM

## 2023-08-23 DIAGNOSIS — F17.200: ICD-10-CM

## 2023-08-23 DIAGNOSIS — F12.90: ICD-10-CM

## 2023-08-23 PROCEDURE — 99284 EMERGENCY DEPT VISIT MOD MDM: CPT

## 2023-08-23 PROCEDURE — 72100 X-RAY EXAM L-S SPINE 2/3 VWS: CPT

## 2023-08-23 PROCEDURE — 96372 THER/PROPH/DIAG INJ SC/IM: CPT

## 2023-08-23 PROCEDURE — 96374 THER/PROPH/DIAG INJ IV PUSH: CPT

## 2023-08-23 NOTE — ED
General Adult HPI





- General


Stated complaint: Back Pain





- History of Present Illness


Initial comments: 


28 year old female present to ED with a chief complaint of back pain.  Patient 

states she was trying to move a  mattress approximately an hour ago when she 

felt a "tear" in her back since then reports pain of the lower back worse with 

movement.  He denies saddle anesthesia.  Denies incontinence.  Denies any other 

injury at this time.  Denies chest pain shortness of breath.  No other 

complaints.








- Related Data


                                  Previous Rx's











 Medication  Instructions  Recorded


 


Albuterol Sulfate [Albuterol 1 puff PO Q4-6H #8.5 gm 08/15/22





Sulfate Hfa]  


 


Acetaminophen Tab [Tylenol] 650 mg PO Q4HR PRN  tab 11/15/22


 


Lithium Carbonate 300 mg PO BID 30 Days  cap 11/15/22


 


Lurasidone [Latuda] 20 mg PO W/SUPPER 30 Days  tab 11/15/22


 


hydrOXYzine pamoate [Vistaril] 25 mg PO BID PRN 14 Days  cap 11/15/22


 


traZODone HCL [Desyrel] 100 mg PO HS 30 Days  tab 11/15/22


 


Baclofen 10 mg PO TID PRN #12 tab 08/23/23


 


Ibuprofen [Motrin] 600 mg PO Q8HR PRN #30 tab 08/23/23











                                    Allergies











Allergy/AdvReac Type Severity Reaction Status Date / Time


 


No Known Allergies Allergy   Verified 08/23/23 11:17














Review of Systems


ROS Statement: 


Those systems with pertinent positive or pertinent negative responses have been 

documented in the HPI.





ROS Other: All systems not noted in ROS Statement are negative.





Past Medical History


Past Medical History: No Reported History


History of Any Multi-Drug Resistant Organisms: None Reported


Past Surgical History: No Surgical Hx Reported


Past Anesthesia/Blood Transfusion Reactions: No Reported Reaction


Past Psychological History: ADD/ADHD, Panic Disorder


Smoking Status: Current every day smoker


Past Alcohol Use History: Occasional


Past Drug Use History: Marijuana





General Exam


Limitations: no limitations


General appearance: alert, in no apparent distress


Head exam: Present: atraumatic, normocephalic


ENT exam: Present: mucous membranes moist


Neck exam: Present: normal inspection, other (No Midline cervical spinal 

tenderness to palpation.)


Respiratory exam: Present: normal lung sounds bilaterally


Cardiovascular Exam: Present: regular rate, normal rhythm


GI/Abdominal exam: Present: soft (Nontender.  No rebound guarding or rigidity.)


Extremities exam: Present: normal inspection, other (Strength and Sensation of 

bilateral lower extremities equal and intact.  DP/PT pulses 2+.)


Back exam: Present: other (No midline thoracic or lumbar spinal tenderness to 

palpation.  Lower lumbar Paraspinal tenderness to palpation.)


Neurological exam: Present: alert, oriented X3


Skin exam: Present: warm, dry





Course


                                   Vital Signs











  08/23/23





  11:14


 


Temperature 98.1 F


 


Pulse Rate 81


 


Respiratory 18





Rate 


 


Blood Pressure 108/67


 


O2 Sat by Pulse 98





Oximetry 














Medical Decision Making





- Medical Decision Making


Was pt. sent in by a medical professional or institution (, PA, NP, urgent 

care, hospital, or nursing home...) When possible be specific


@  -No


Did you speak to anyone other than the patient for history (EMS, parent, family,

 police, friend...)? What history was obtained from this source 


@  -No


Did you review nursing and triage notes (agree or disagree)?  Why? 


@  -I reviewed and agree with nursing and triage notes


Were old charts reviewed (outside hosp., previous admission, EMS record, old 

EKG, old radiological studies, urgent care reports/EKG's, nursing home records)?

 Report findings 


@  -No old charts were reviewed


Differential Diagnosis (chest pain, altered mental status, abdominal pain women,

 abdominal pain men, vaginal bleeding, weakness, fever, dyspnea, syncope, 

headache, dizziness, GI bleed, back pain, seizure, CVA, palpatations, mental 

health, musculoskeletal)? 


@  -Differential Back Pain:


Strain, zoster, cauda equina syndrome, epidural abscess, vertebral 

osteomyelitis, discitis, fracture, subluxation, disc herniation, DJD, spinal 

stenosis, dissection, AAA, pancreatitis, peptic ulcer disease, pyelonephritis, 

kidney stone, this is not meant to be an all-inclusive list.


EKG interpreted by me (3pts min.).


@  -None


X-rays interpreted by me (1pt min.).


@  -X-ray interpreted by me.  X-ray of the lumbar spine show no acute findings.


CT interpreted by me (1pt min.).


@  -None done


U/S interpreted by me (1pt. min.).


@  -None done


What testing was considered but not performed or refused? (CT, X-rays, U/S, 

labs)? Why?


@  -None


What meds were considered but not given or refused? Why?


@  -None


Did you discuss the management of the patient with other professionals 

(professionals i.e. , PA, NP, lab, RT, psych nurse, , , 

teacher, , )? Give summary


@  -No


Was smoking cessation discussed for >3mins.?


@  -No


Was critical care preformed (if so, how long)?


@  -No


Were there social determinants of health that impacted care today? How? 

(Homelessness, low income, unemployed, alcoholism, drug addiction, 

transportation, low edu. Level, literacy, decrease access to med. care, [facility], re

hab)?


@  -No


Was there de-escalation of care discussed even if they declined (Discuss DNR or 

withdrawal of care, Hospice)? DNR status


@  -No


What co-morbidities impacted this encounter? (DM, HTN, Smoking, COPD, CAD, 

Cancer, CVA, ARF, Chemo, Hep., AIDS, mental health diagnosis, sleep apnea, 

morbid obesity)?


@  -None


Was patient admitted / discharged? Hospital course, mention meds given and 

route, prescriptions, significant lab abnormalities, going to OR and other 

pertinent info.


@  -Discharge.  Imaging shows no acute findings.  Patient had significant 

improvement of pain with Toradol and Norflex here.  Symptoms likely 

musculoskeletal in nature.  Patient discharged home with prescription for 

ibuprofen and baclofen.  Advised follow-up with PCP.  Discharged home in stable 

condition.  Discussed return precautions patient verbalizes agreement.


Undiagnosed new problem with uncertain prognosis?


@  -No


Drug Therapy requiring intensive monitoring for toxicity (Heparin, Nitro, 

Insulin, Cardizem)?


@  -No


Were any procedures done?


@  -No


Diagnosis/symptom?


@  -Back pain


Acute, or Chronic, or Acute on Chronic?


@  -Acute


Uncomplicated (without systemic symptoms) or Complicated (systemic symptoms)?


@  -Uncomplicated


Side effects of treatment?


@  -No


Exacerbation, Progression, or Severe Exacerbation?


@  -No


Poses a threat to life or bodily function? How? (Chest pain, USA, MI, pneumonia,

 PE, COPD, DKA, ARF, appy, cholecystitis, CVA, Diverticulitis, Homicidal, 

Suicidal, threat to staff... and all critical care pts)


@  -No








Disposition


Clinical Impression: 


 Back pain





Disposition: HOME SELF-CARE


Condition: Good


Instructions (If sedation given, give patient instructions):  Acute Low Back 

Pain (ED)


Additional Instructions: 


Please return to the Emergency Department if symptoms worsen or any other 

concerns.


Prescriptions: 


Baclofen 10 mg PO TID PRN #12 tab


 PRN Reason: Pain


Ibuprofen [Motrin] 600 mg PO Q8HR PRN #30 tab


 PRN Reason: Pain


Is patient prescribed a controlled substance at d/c from ED?: No


Referrals: 


Anant Brand [Primary Care Provider] - 1-2 days


Time of Disposition: 13:13

## 2023-08-23 NOTE — XR
EXAM TYPE: LUMBAR SPINE X RAY SERIES

 

COMPARISON: NONE

 

HISTORY: Pain

 

TECHNIQUE: 3 views are submitted.

 

FINDINGS:

Alignment is anatomic.  The pedicles are intact.  The transverse processes are intact.  There is no s
pondylolysis or spondylolisthesis.  Spina bifida occulta sacrococcygeal junction.

 

IMPRESSION:

1. No acute process.  If there is concern for disc herniation correlate with MRI.